# Patient Record
Sex: MALE | URBAN - METROPOLITAN AREA
[De-identification: names, ages, dates, MRNs, and addresses within clinical notes are randomized per-mention and may not be internally consistent; named-entity substitution may affect disease eponyms.]

---

## 2021-04-20 ENCOUNTER — NEW PATIENT (OUTPATIENT)
Dept: URBAN - METROPOLITAN AREA CLINIC 66 | Facility: CLINIC | Age: 31
End: 2021-04-20

## 2021-04-20 DIAGNOSIS — H52.223 REGULAR ASTIGMATISM, BILATERAL: Primary | ICD-10-CM

## 2021-04-20 ASSESSMENT — VISUAL ACUITY
OS: 20/20
OD: 20/20

## 2021-04-20 ASSESSMENT — KERATOMETRY
OD: 43.30
OS: 43.44

## 2021-04-20 ASSESSMENT — INTRAOCULAR PRESSURE
OS: 15
OD: 13

## 2021-06-10 ENCOUNTER — Encounter (OUTPATIENT)
Dept: URBAN - METROPOLITAN AREA CLINIC 66 | Facility: CLINIC | Age: 31
End: 2021-06-10

## 2021-06-10 PROCEDURE — LASIK LASIK SURGEON'S FEE: CUSTOM | Performed by: OPHTHALMOLOGY

## 2021-06-10 PROCEDURE — LASIK LASIK PRKSURGEON'S FEE: CUSTOM | Performed by: OPHTHALMOLOGY

## 2021-06-11 ENCOUNTER — POST-OPERATIVE VISIT (OUTPATIENT)
Dept: URBAN - METROPOLITAN AREA CLINIC 64 | Facility: CLINIC | Age: 31
End: 2021-06-11

## 2021-06-11 DIAGNOSIS — Z48.810 ENCOUNTER FOR SURGICAL AFTERCARE FOLLOWING SURGERY ON A SENSE ORGAN: Primary | ICD-10-CM

## 2021-06-11 PROCEDURE — 99024 POSTOP FOLLOW-UP VISIT: CPT | Performed by: OPTOMETRIST

## 2021-06-11 NOTE — IMPRESSION/PLAN
Impression: S/P LASIK - Standard OU - . Encounter for surgical aftercare following surgery on a sense organ  Z48.810. Excellent post op course Plan: RTC 2 wks.

## 2021-06-24 ENCOUNTER — POST-OPERATIVE VISIT (OUTPATIENT)
Dept: URBAN - METROPOLITAN AREA CLINIC 64 | Facility: CLINIC | Age: 31
End: 2021-06-24

## 2021-06-24 PROCEDURE — 99024 POSTOP FOLLOW-UP VISIT: CPT | Performed by: OPTOMETRIST

## 2021-06-24 NOTE — IMPRESSION/PLAN
Impression: S/P LASIK - Standard OU - 14 Days. Encounter for surgical aftercare following surgery on a sense organ  Z48.810. Plan: S/P Lasik OU - doing well. RTC 6 months.

## 2022-01-12 ENCOUNTER — POST-OPERATIVE VISIT (OUTPATIENT)
Dept: URBAN - METROPOLITAN AREA CLINIC 64 | Facility: CLINIC | Age: 32
End: 2022-01-12

## 2022-01-12 PROCEDURE — 99024 POSTOP FOLLOW-UP VISIT: CPT | Performed by: OPTOMETRIST

## 2022-01-12 ASSESSMENT — VISUAL ACUITY
OD: 20/20
OS: 20/20

## 2022-01-12 NOTE — IMPRESSION/PLAN
Impression: S/P LASIK - Standard OU - 216 Days. Encounter for surgical aftercare following surgery on a sense organ  Z48.810. Plan: S/P Lasik OU - doing well. No further tx recommended. Recommend oral fish oil and art tears BID for dry eye.